# Patient Record
Sex: FEMALE | Race: WHITE | NOT HISPANIC OR LATINO | Employment: STUDENT | URBAN - METROPOLITAN AREA
[De-identification: names, ages, dates, MRNs, and addresses within clinical notes are randomized per-mention and may not be internally consistent; named-entity substitution may affect disease eponyms.]

---

## 2022-12-03 PROCEDURE — U0004 COV-19 TEST NON-CDC HGH THRU: HCPCS | Performed by: FAMILY MEDICINE

## 2024-06-18 ENCOUNTER — OFFICE VISIT (OUTPATIENT)
Dept: OBSTETRICS AND GYNECOLOGY | Facility: CLINIC | Age: 24
End: 2024-06-18
Payer: COMMERCIAL

## 2024-06-18 VITALS
HEIGHT: 64 IN | SYSTOLIC BLOOD PRESSURE: 122 MMHG | WEIGHT: 205.6 LBS | BODY MASS INDEX: 35.1 KG/M2 | DIASTOLIC BLOOD PRESSURE: 82 MMHG

## 2024-06-18 DIAGNOSIS — Z01.419 WOMEN'S ANNUAL ROUTINE GYNECOLOGICAL EXAMINATION: Primary | ICD-10-CM

## 2024-06-18 DIAGNOSIS — Z30.41 ENCOUNTER FOR SURVEILLANCE OF CONTRACEPTIVE PILLS: ICD-10-CM

## 2024-06-18 DIAGNOSIS — Z12.39 ENCOUNTER FOR BREAST CANCER SCREENING USING NON-MAMMOGRAM MODALITY: ICD-10-CM

## 2024-06-18 DIAGNOSIS — N92.0 MENORRHAGIA WITH REGULAR CYCLE: ICD-10-CM

## 2024-06-18 PROCEDURE — 99385 PREV VISIT NEW AGE 18-39: CPT | Performed by: OBSTETRICS & GYNECOLOGY

## 2024-06-18 RX ORDER — NORETHINDRONE ACETATE AND ETHINYL ESTRADIOL 5-7-9-7
1 KIT ORAL DAILY
Qty: 84 TABLET | Refills: 4 | Status: SHIPPED | OUTPATIENT
Start: 2024-06-18

## 2024-06-18 RX ORDER — DUPILUMAB 300 MG/2ML
300 INJECTION, SOLUTION SUBCUTANEOUS
COMMUNITY
Start: 2024-06-03

## 2024-06-18 RX ORDER — LORATADINE 10 MG/1
10 CAPSULE, LIQUID FILLED ORAL DAILY
COMMUNITY

## 2024-06-18 RX ORDER — NORETHINDRONE ACETATE AND ETHINYL ESTRADIOL 5-7-9-7
1 KIT ORAL DAILY
COMMUNITY
Start: 2024-04-21 | End: 2024-06-18 | Stop reason: SDUPTHER

## 2024-06-18 NOTE — PROGRESS NOTES
Gynecologic Annual Exam Note        Gynecologic Exam and Establish Care        Subjective     HPI  Elena Tim is a 24 y.o.  female who presents for annual well woman exam as a established patient. There were no changes to her medical or surgical history since her last visit. Patient's last menstrual period was 2024 (exact date). Her periods occur every month, lasting 5 days.  The flow is heavy. She reports wearing 1 tampon and pad and reports bleeding through the tampon onto her pad light to moderately within 3 hours. She reports dysmenorrhea is mild occurring first 1-2 days of flow. She states that her periods may be heavier and more painful when her period falls on the week she is due for her next Dupixent injection. Marital Status: engaged. She is getting  in November. She has never been sexually active. STD testing recommendations have been explained to the patient and she does not desire STD testing.    The patient would like to discuss the following complaints today: Patient has been on birth control since the 7th grade for acne.     Additional OB/GYN History   contraceptive methods: Abstinence and OCP (estrogen/progesterone)  Desires to: continue contraception  Thromboembolic Disease: none  History of migraines: no  Age of menarche: 7th grade    History of STD: no    Last Pap : Patient has never had a pap smear.  Last Completed Pap Smear       This patient has no relevant Health Maintenance data.             History of abnormal Pap smear:  N/A  Gardasil status:completed  Family history of uterine, colon, breast, or ovarian cancer: yes - MG- breast CA  Performs monthly Self-Breast Exam: no  Exercises Regularly:yes  Feelings of Anxiety or Depression: no  Tobacco Usage?: No       Current Outpatient Medications:     Dupixent 300 MG/2ML solution prefilled syringe, Inject 2 mL under the skin into the appropriate area as directed Every 14 (Fourteen) Days., Disp: , Rfl:     Loratadine 10  MG capsule, Take 1 capsule by mouth Daily., Disp: , Rfl:     Probiotic Product (PROBIOTIC-10 PO), Take  by mouth., Disp: , Rfl:     Stahist AD 25-60 MG tablet, Take 1 tablet by mouth 2 (Two) Times a Day., Disp: , Rfl:     tacrolimus (PROTOPIC) 0.1 % ointment, 2 (Two) Times a Day., Disp: , Rfl:     Tilia Fe 1-20/1-30/1-35 MG-MCG tablet, Take 1 tablet by mouth Daily., Disp: 84 tablet, Rfl: 4     Patient is requesting refills of YENI.    OB History          0    Para   0    Term   0       0    AB   0    Living   0         SAB   0    IAB   0    Ectopic   0    Molar   0    Multiple   0    Live Births   0                Health Maintenance   Topic Date Due    Annual Gynecologic Pelvic and Breast Exam  Never done    BMI FOLLOWUP  Never done    HPV VACCINES (1 - 3-dose series) Never done    COVID-19 Vaccine (3 - - season) 2023    HEPATITIS C SCREENING  Never done    ANNUAL PHYSICAL  Never done    PAP SMEAR  Never done    INFLUENZA VACCINE  2024    TDAP/TD VACCINES (3 - Td or Tdap) 2032    Pneumococcal Vaccine 0-64  Aged Out       Past Medical History:   Diagnosis Date    Allergies     Eczema     Spina bifida occulta     Urinary tract infection     A few times when I was younger. Not recurring.        Past Surgical History:   Procedure Laterality Date    TONSILLECTOMY         The additional following portions of the patient's history were reviewed and updated as appropriate: allergies, current medications, past family history, past medical history, past social history, past surgical history, and problem list.    Review of Systems   Constitutional: Negative.    HENT: Negative.     Eyes: Negative.    Respiratory: Negative.     Cardiovascular: Negative.    Gastrointestinal: Negative.    Endocrine: Negative.    Genitourinary: Negative.    Musculoskeletal: Negative.    Skin: Negative.    Allergic/Immunologic: Negative.    Neurological: Negative.    Hematological: Negative.   "  Psychiatric/Behavioral: Negative.           I have reviewed and agree with the HPI, ROS, and historical information as entered above. Evelia Wesley MD          Objective   /82   Ht 162.6 cm (64\")   Wt 93.3 kg (205 lb 9.6 oz)   LMP 06/12/2024 (Exact Date)   BMI 35.29 kg/m²     Physical Exam  Vitals and nursing note reviewed. Exam conducted with a chaperone present.   Constitutional:       Appearance: She is well-developed.   HENT:      Head: Normocephalic and atraumatic.   Neck:      Thyroid: No thyroid mass or thyromegaly.   Cardiovascular:      Rate and Rhythm: Normal rate and regular rhythm.      Heart sounds: No murmur heard.  Pulmonary:      Effort: Pulmonary effort is normal. No retractions.      Breath sounds: Normal breath sounds. No wheezing, rhonchi or rales.   Chest:      Chest wall: No mass or tenderness.   Breasts:     Right: Normal. No mass, nipple discharge, skin change or tenderness.      Left: Normal. No mass, nipple discharge, skin change or tenderness.   Abdominal:      General: Bowel sounds are normal.      Palpations: Abdomen is soft. Abdomen is not rigid. There is no mass.      Tenderness: There is no abdominal tenderness. There is no guarding.      Hernia: No hernia is present. There is no hernia in the left inguinal area.   Genitourinary:     Labia:         Right: No rash, tenderness or lesion.         Left: No rash, tenderness or lesion.       Vagina: Normal. No vaginal discharge or lesions.      Cervix: No cervical motion tenderness, discharge, lesion or cervical bleeding.      Uterus: Normal. Not enlarged, not fixed and not tender.       Adnexa:         Right: No mass or tenderness.          Left: No mass or tenderness.        Rectum: No external hemorrhoid.   Musculoskeletal:      Cervical back: Normal range of motion. No muscular tenderness.   Neurological:      Mental Status: She is alert and oriented to person, place, and time.   Psychiatric:         Behavior: Behavior " normal.            Assessment and Plan    Problem List Items Addressed This Visit    None  Visit Diagnoses       Women's annual routine gynecological examination    -  Primary    Relevant Orders    LIQUID-BASED PAP SMEAR WITH HPV GENOTYPING IF ASCUS ([facility])    Encounter for breast cancer screening using non-mammogram modality        Encounter for surveillance of contraceptive pills        Menorrhagia with regular cycle        Relevant Orders    CBC & Differential    TSH    Von Willebrand Factor Activity    Ferritin    Protime-INR    APTT    US Non-ob Transvaginal            GYN annual well woman exam.   Reviewed pap guidelines.   Tolerated pap well.  Heavy periods despite YENI.  Will return for u/s and labs today.  Discussed benefits and risks of YENI including headache, nausea and breast tenderness in first several cycles.  Also discussed inherent risks of VTE and increased lifetime risk of breast cancer diagnosis with any hormonal birth control. May consider switching pill after evaluation.  Return in about 4 weeks (around 7/16/2024) for US with Next Visit.    Evelia Wesley MD  06/18/2024

## 2024-06-19 LAB
APTT PPP: 29 SEC (ref 24–33)
BASOPHILS # BLD AUTO: 0 X10E3/UL (ref 0–0.2)
BASOPHILS NFR BLD AUTO: 1 %
EOSINOPHIL # BLD AUTO: 0.2 X10E3/UL (ref 0–0.4)
EOSINOPHIL NFR BLD AUTO: 3 %
ERYTHROCYTE [DISTWIDTH] IN BLOOD BY AUTOMATED COUNT: 12 % (ref 11.7–15.4)
FERRITIN SERPL-MCNC: 59 NG/ML (ref 15–150)
HCT VFR BLD AUTO: 41.9 % (ref 34–46.6)
HGB BLD-MCNC: 13.8 G/DL (ref 11.1–15.9)
IMM GRANULOCYTES # BLD AUTO: 0 X10E3/UL (ref 0–0.1)
IMM GRANULOCYTES NFR BLD AUTO: 0 %
INR PPP: 1 (ref 0.9–1.2)
LYMPHOCYTES # BLD AUTO: 2.1 X10E3/UL (ref 0.7–3.1)
LYMPHOCYTES NFR BLD AUTO: 27 %
MCH RBC QN AUTO: 29.3 PG (ref 26.6–33)
MCHC RBC AUTO-ENTMCNC: 32.9 G/DL (ref 31.5–35.7)
MCV RBC AUTO: 89 FL (ref 79–97)
MONOCYTES # BLD AUTO: 0.7 X10E3/UL (ref 0.1–0.9)
MONOCYTES NFR BLD AUTO: 9 %
NEUTROPHILS # BLD AUTO: 4.7 X10E3/UL (ref 1.4–7)
NEUTROPHILS NFR BLD AUTO: 60 %
PLATELET # BLD AUTO: 355 X10E3/UL (ref 150–450)
PROTHROMBIN TIME: 10.3 SEC (ref 9.1–12)
RBC # BLD AUTO: 4.71 X10E6/UL (ref 3.77–5.28)
TSH SERPL DL<=0.005 MIU/L-ACNC: 1.14 UIU/ML (ref 0.45–4.5)
VWF:RCO ACT/NOR PPP PL AGG: 93 % (ref 50–200)
WBC # BLD AUTO: 7.7 X10E3/UL (ref 3.4–10.8)

## 2024-06-26 LAB — REF LAB TEST METHOD: NORMAL

## 2024-07-16 ENCOUNTER — OFFICE VISIT (OUTPATIENT)
Dept: OBSTETRICS AND GYNECOLOGY | Facility: CLINIC | Age: 24
End: 2024-07-16
Payer: COMMERCIAL

## 2024-07-16 VITALS
SYSTOLIC BLOOD PRESSURE: 118 MMHG | WEIGHT: 205.6 LBS | BODY MASS INDEX: 35.1 KG/M2 | HEIGHT: 64 IN | DIASTOLIC BLOOD PRESSURE: 82 MMHG

## 2024-07-16 DIAGNOSIS — N93.9 ABNORMAL UTERINE BLEEDING (AUB): ICD-10-CM

## 2024-07-16 DIAGNOSIS — Q51.9 CONGENITAL UTERINE ANOMALY: Primary | ICD-10-CM

## 2024-07-16 PROCEDURE — 99213 OFFICE O/P EST LOW 20 MIN: CPT | Performed by: OBSTETRICS & GYNECOLOGY

## 2024-07-16 RX ORDER — NORETHINDRONE ACETATE AND ETHINYL ESTRADIOL 1.5-30(21)
1 KIT ORAL DAILY
Qty: 84 TABLET | Refills: 3 | Status: SHIPPED | OUTPATIENT
Start: 2024-07-16 | End: 2025-06-17

## 2024-07-16 NOTE — PROGRESS NOTES
Chief Complaint   Patient presents with    Follow-up     menorrhagia       Subjective   HPI  Elena Tim is a 24 y.o. female, . Her last LMP was Patient's last menstrual period was 07/10/2024 (exact date). She presents for follow up on menorrhagia.      Patient was last seen for her annual exam on 24. At that time she reported bleeding through a tampon and light to moderately onto a pad within 3 hours  Labs were performed and the plan was to return for an US. Since then she reports her symptoms/issue has remained unchanged. The patient reports additional symptoms as none.      TVUS performed today.       Additional OB/GYN History     Last Pap :   Last Completed Pap Smear            PAP SMEAR (Every 3 Years) Next due on 2024  LIQUID-BASED PAP SMEAR WITH HPV GENOTYPING IF ASCUS ([facility])                    Last mammogram:   Last Completed Mammogram       This patient has no relevant Health Maintenance data.            Tobacco Usage?: No   OB History          0    Para   0    Term   0       0    AB   0    Living   0         SAB   0    IAB   0    Ectopic   0    Molar   0    Multiple   0    Live Births   0                  Current Outpatient Medications:     Dupixent 300 MG/2ML solution prefilled syringe, Inject 2 mL under the skin into the appropriate area as directed Every 14 (Fourteen) Days., Disp: , Rfl:     Loratadine 10 MG capsule, Take 1 capsule by mouth Daily., Disp: , Rfl:     Probiotic Product (PROBIOTIC-10 PO), Take  by mouth., Disp: , Rfl:     tacrolimus (PROTOPIC) 0.1 % ointment, 2 (Two) Times a Day., Disp: , Rfl:     norethindrone-ethinyl estradiol-iron (Loestrin Fe 1.5/30) 1.5-30 MG-MCG tablet, Take 1 tablet by mouth Daily for 336 days., Disp: 84 tablet, Rfl: 3     Past Medical History:   Diagnosis Date    Allergies     Didelphic uterus     Eczema     Spina bifida occulta     Urinary tract infection     A few times when I was younger. Not  "recurring.        Past Surgical History:   Procedure Laterality Date    TONSILLECTOMY  2004       The additional following portions of the patient's history were reviewed and updated as appropriate: allergies and current medications.    Review of Systems   Constitutional: Negative.    HENT: Negative.     Eyes: Negative.    Respiratory: Negative.     Cardiovascular: Negative.    Gastrointestinal: Negative.    Endocrine: Negative.    Genitourinary:  Positive for menstrual problem.   Musculoskeletal: Negative.    Skin: Negative.    Allergic/Immunologic: Negative.    Neurological: Negative.    Hematological: Negative.    Psychiatric/Behavioral: Negative.         I have reviewed and agree with the HPI, ROS, and historical information as entered above. Evelia Wesley MD      Objective   /82   Ht 162.6 cm (64\")   Wt 93.3 kg (205 lb 9.6 oz)   LMP 07/10/2024 (Exact Date)   BMI 35.29 kg/m²     Physical Exam  Constitutional:       Appearance: She is well-developed.   HENT:      Head: Normocephalic.   Eyes:      Conjunctiva/sclera: Conjunctivae normal.   Pulmonary:      Effort: Pulmonary effort is normal.   Psychiatric:         Behavior: Behavior normal.         Assessment & Plan     Encounter Diagnoses   Name Primary?    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  Plan to proceed with MRI for clarification and evaluation of renal system as well.  Change to Lo estrin 1.5/30 to see if we can get better bleeding control          Evelia Wesley MD  07/16/2024  "

## 2024-08-13 ENCOUNTER — HOSPITAL ENCOUNTER (OUTPATIENT)
Dept: MRI IMAGING | Facility: HOSPITAL | Age: 24
Discharge: HOME | End: 2024-08-13
Admitting: OBSTETRICS & GYNECOLOGY
Payer: COMMERCIAL

## 2024-08-13 DIAGNOSIS — Q51.9 CONGENITAL UTERINE ANOMALY: ICD-10-CM

## 2024-08-13 PROCEDURE — A9577 INJ MULTIHANCE: HCPCS | Performed by: OBSTETRICS & GYNECOLOGY

## 2024-08-13 PROCEDURE — 0 GADOBENATE DIMEGLUMINE 529 MG/ML SOLUTION: Performed by: OBSTETRICS & GYNECOLOGY

## 2024-08-13 PROCEDURE — 72197 MRI PELVIS W/O & W/DYE: CPT

## 2024-08-13 RX ADMIN — GADOBENATE DIMEGLUMINE 19 ML: 529 INJECTION, SOLUTION INTRAVENOUS at 09:30
